# Patient Record
Sex: FEMALE | Race: WHITE | NOT HISPANIC OR LATINO | URBAN - METROPOLITAN AREA
[De-identification: names, ages, dates, MRNs, and addresses within clinical notes are randomized per-mention and may not be internally consistent; named-entity substitution may affect disease eponyms.]

---

## 2024-01-04 ENCOUNTER — LAB OUTSIDE AN ENCOUNTER (OUTPATIENT)
Dept: URBAN - METROPOLITAN AREA CLINIC 118 | Facility: CLINIC | Age: 59
End: 2024-01-04

## 2024-01-04 ENCOUNTER — OFFICE VISIT (OUTPATIENT)
Dept: URBAN - METROPOLITAN AREA CLINIC 118 | Facility: CLINIC | Age: 59
End: 2024-01-04
Payer: COMMERCIAL

## 2024-01-04 VITALS
SYSTOLIC BLOOD PRESSURE: 103 MMHG | TEMPERATURE: 97.3 F | BODY MASS INDEX: 22.5 KG/M2 | WEIGHT: 140 LBS | HEART RATE: 74 BPM | HEIGHT: 66 IN | DIASTOLIC BLOOD PRESSURE: 64 MMHG

## 2024-01-04 DIAGNOSIS — R63.4 UNINTENTIONAL WEIGHT LOSS: ICD-10-CM

## 2024-01-04 DIAGNOSIS — R19.4 CHANGE IN BOWEL HABITS: ICD-10-CM

## 2024-01-04 DIAGNOSIS — R13.19 CERVICAL DYSPHAGIA: ICD-10-CM

## 2024-01-04 PROBLEM — 430331003: Status: ACTIVE | Noted: 2024-01-04

## 2024-01-04 PROBLEM — 448765001: Status: ACTIVE | Noted: 2024-01-04

## 2024-01-04 PROBLEM — 312824007: Status: ACTIVE | Noted: 2024-01-04

## 2024-01-04 PROCEDURE — 99204 OFFICE O/P NEW MOD 45 MIN: CPT | Performed by: INTERNAL MEDICINE

## 2024-01-04 RX ORDER — PANTOPRAZOLE SODIUM 20 MG/1
1 TABLET TABLET, DELAYED RELEASE ORAL ONCE A DAY
Qty: 30 | Refills: 3 | OUTPATIENT
Start: 2024-01-04

## 2024-01-04 RX ORDER — MAGNESIUM GLYCINATE 100 MG
AS DIRECTED TABLET ORAL
Status: ACTIVE | COMMUNITY

## 2024-01-04 RX ORDER — ALENDRONATE SODIUM 10 MG/1
1 TABLET 30 MINUTES BEFORE THE FIRST FOOD, BEVERAGE OR MEDICINE OF THE DAY WITH PLAIN WATER TABLET ORAL ONCE A DAY
Status: DISCONTINUED | COMMUNITY

## 2024-01-04 RX ORDER — VENLAFAXINE HYDROCHLORIDE 37.5 MG/1
1 CAPSULE WITH FOOD CAPSULE, EXTENDED RELEASE ORAL ONCE A DAY
Status: ACTIVE | COMMUNITY

## 2024-01-04 RX ORDER — CHOLECALCIFEROL (VITAMIN D3) 25 MCG
1 CAPSULE CAPSULE ORAL ONCE A DAY
Status: ACTIVE | COMMUNITY

## 2024-01-04 NOTE — PHYSICAL EXAM GASTROINTESTINAL
Abdomen: soft and nondistended, no visible masses, mild epigastric tenderness to palpation, no guarding or rigidity, no rebound tenderness, no palpable masses, normal bowel sounds. Liver and Spleen: no hepatosplenomegaly, liver nontender. Rectal: Deferred

## 2024-01-04 NOTE — HPI-TODAY'S VISIT:
Patient presents today upon referral by PCP Dr. Ethan Luis for evaluation of GERD and dysphagia. States onset of reflux was 2 years ago once started Alendronate for osteoporosis, which she stopped taking for the past month. Notes she was previously on Prolia injections which she would prefer to restart and is planning on making appt with endocrinologist (Dr. Foley) to discuss this. She describes mid-sternal dysphagia and odynophagia to both liquids and solids for the past month, not worsening. Does note some improvement in sxs since taking otc prilosec for 3 weeks. In the past month has lost 4-5 lbs attributed to decreased food intake from early satiety and pain with swallowing. Also c/o GI upset with nausea and one bout of diarrhea every other day since weaning off Effexor. Psych aware she is tapering off. No emesis. No hematochezia or melena. No prior EGD. Last colonoscopy in 2017 normal recommended repeat in 5 years d/t FHX CRC in her sister before 59yo.

## 2024-02-02 ENCOUNTER — COL/EGD (OUTPATIENT)
Dept: URBAN - METROPOLITAN AREA SURGERY CENTER 23 | Facility: SURGERY CENTER | Age: 59
End: 2024-02-02
Payer: COMMERCIAL

## 2024-02-02 DIAGNOSIS — R13.19 CERVICAL DYSPHAGIA: ICD-10-CM

## 2024-02-02 DIAGNOSIS — K22.89 OTHER SPECIFIED DISEASE OF ESOPHAGUS: ICD-10-CM

## 2024-02-02 DIAGNOSIS — Z80.0 BROTHER AT YOUNG AGE FAMILY HISTORY OF COLON CANCER: ICD-10-CM

## 2024-02-02 DIAGNOSIS — Z12.11 COLON CANCER SCREENING: ICD-10-CM

## 2024-02-02 PROCEDURE — G0105 COLORECTAL SCRN; HI RISK IND: HCPCS | Performed by: INTERNAL MEDICINE

## 2024-02-02 PROCEDURE — 43239 EGD BIOPSY SINGLE/MULTIPLE: CPT | Performed by: INTERNAL MEDICINE

## 2024-02-02 PROCEDURE — 43249 ESOPH EGD DILATION <30 MM: CPT | Performed by: INTERNAL MEDICINE

## 2024-02-02 RX ORDER — PANTOPRAZOLE SODIUM 20 MG/1
1 TABLET TABLET, DELAYED RELEASE ORAL ONCE A DAY
Qty: 30 | Refills: 3 | Status: ACTIVE | COMMUNITY
Start: 2024-01-04

## 2024-02-02 RX ORDER — CHOLECALCIFEROL (VITAMIN D3) 25 MCG
1 CAPSULE CAPSULE ORAL ONCE A DAY
Status: ACTIVE | COMMUNITY

## 2024-02-02 RX ORDER — MAGNESIUM GLYCINATE 100 MG
AS DIRECTED TABLET ORAL
Status: ACTIVE | COMMUNITY

## 2024-02-02 RX ORDER — VENLAFAXINE HYDROCHLORIDE 37.5 MG/1
1 CAPSULE WITH FOOD CAPSULE, EXTENDED RELEASE ORAL ONCE A DAY
Status: ACTIVE | COMMUNITY

## 2024-03-05 ENCOUNTER — OV EP (OUTPATIENT)
Dept: URBAN - METROPOLITAN AREA CLINIC 118 | Facility: CLINIC | Age: 59
End: 2024-03-05
Payer: COMMERCIAL

## 2024-03-05 VITALS
SYSTOLIC BLOOD PRESSURE: 110 MMHG | WEIGHT: 139 LBS | BODY MASS INDEX: 22.34 KG/M2 | HEIGHT: 66 IN | HEART RATE: 63 BPM | TEMPERATURE: 98.1 F | DIASTOLIC BLOOD PRESSURE: 75 MMHG

## 2024-03-05 DIAGNOSIS — R63.4 UNINTENTIONAL WEIGHT LOSS: ICD-10-CM

## 2024-03-05 DIAGNOSIS — R19.4 CHANGE IN BOWEL HABITS: ICD-10-CM

## 2024-03-05 DIAGNOSIS — R10.84 GENERALIZED ABDOMINAL PAIN: ICD-10-CM

## 2024-03-05 DIAGNOSIS — R13.10 DYSPHAGIA, UNSPECIFIED TYPE: ICD-10-CM

## 2024-03-05 PROCEDURE — 99214 OFFICE O/P EST MOD 30 MIN: CPT

## 2024-03-05 RX ORDER — PANTOPRAZOLE SODIUM 40 MG/1
1 TABLET TABLET, DELAYED RELEASE ORAL
Qty: 90 | Refills: 3 | OUTPATIENT

## 2024-03-05 RX ORDER — VENLAFAXINE HYDROCHLORIDE 37.5 MG/1
1 CAPSULE WITH FOOD CAPSULE, EXTENDED RELEASE ORAL ONCE A DAY
Status: DISCONTINUED | COMMUNITY

## 2024-03-05 RX ORDER — CHOLECALCIFEROL (VITAMIN D3) 25 MCG
1 CAPSULE CAPSULE ORAL ONCE A DAY
Status: ACTIVE | COMMUNITY

## 2024-03-05 RX ORDER — PANTOPRAZOLE SODIUM 20 MG/1
1 TABLET TABLET, DELAYED RELEASE ORAL ONCE A DAY
Qty: 30 | Refills: 3 | Status: ON HOLD | COMMUNITY
Start: 2024-01-04

## 2024-03-05 RX ORDER — MAGNESIUM GLYCINATE 100 MG
AS DIRECTED TABLET ORAL
Status: ACTIVE | COMMUNITY

## 2024-03-05 RX ORDER — HYOSCYAMINE SULFATE 0.12 MG/1
1 TABLET UNDER THE TONGUE AND ALLOW TO DISSOLVE TABLET SUBLINGUAL
Qty: 90 | Refills: 3 | OUTPATIENT
Start: 2024-03-05 | End: 2024-07-03

## 2024-03-05 NOTE — HPI-TODAY'S VISIT:
3/5/24: Pt presents today for f/u appt. Weight overall stable from last visit (139 lbs previously, 140 lbs today). Since last visit pt had EGD/Colon on 2/2/24. EGD showed circumferential salmon-colored mucosa from 33-35 cm and 5 cm hiatal hernia. EGD bx results not yet resulted/reviewed. Colonoscopy unremarkable except mild sigmoid diverticulosis w/out evidence of bleeding. Of note, has been taking prilosec still w/ breakthrough sxs. Never recieved PPI from pharmacy. Dysphagia improved since being empirically dilated on EGD February 2nd. Sxs of dysphagia only aggravated if doesn't chew food properly. Constant abd discomfort and still with diarrhea, recently fecal incontinence. No fever/chills, N/V, or rectal bleeding. Of note, pt saw ortho and was told to continue bisphosphonate since prolia would not be approved by insurance given her DEXA score, pt would like to get second opinion from Dr. Foley.   ------------------------------------------- 1/4/24 (KELLIE Augustin): Patient presents today upon referral by PCP Dr. Ethan Luis for evaluation of GERD and dysphagia. States onset of reflux was 2 years ago once started Alendronate for osteoporosis, which she stopped taking for the past month. Notes she was previously on Prolia injections which she would prefer to restart and is planning on making appt with endocrinologist (Dr. Foley) to discuss this. She describes mid-sternal dysphagia and odynophagia to both liquids and solids for the past month, not worsening. Does note some improvement in sxs since taking otc prilosec for 3 weeks. In the past month has lost 4-5 lbs attributed to decreased food intake from early satiety and pain with swallowing. Also c/o GI upset with nausea and one bout of diarrhea every other day since weaning off Effexor. Psych aware she is tapering off. No emesis. No hematochezia or melena. No prior EGD. Last colonoscopy in 2017 normal recommended repeat in 5 years d/t FHX CRC in her sister before 61yo.

## 2024-05-30 ENCOUNTER — OFFICE VISIT (OUTPATIENT)
Dept: URBAN - METROPOLITAN AREA CLINIC 118 | Facility: CLINIC | Age: 59
End: 2024-05-30

## 2024-08-16 ENCOUNTER — OFFICE VISIT (OUTPATIENT)
Dept: URBAN - METROPOLITAN AREA CLINIC 92 | Facility: CLINIC | Age: 59
End: 2024-08-16
Payer: COMMERCIAL

## 2024-08-16 ENCOUNTER — ERX REFILL RESPONSE (OUTPATIENT)
Dept: URBAN - METROPOLITAN AREA CLINIC 92 | Facility: CLINIC | Age: 59
End: 2024-08-16

## 2024-08-16 VITALS
TEMPERATURE: 97 F | DIASTOLIC BLOOD PRESSURE: 60 MMHG | HEART RATE: 59 BPM | BODY MASS INDEX: 22.18 KG/M2 | WEIGHT: 138 LBS | HEIGHT: 66 IN | SYSTOLIC BLOOD PRESSURE: 93 MMHG

## 2024-08-16 DIAGNOSIS — R10.13 EPIGASTRIC PAIN: ICD-10-CM

## 2024-08-16 DIAGNOSIS — Z87.19 HISTORY OF GASTROESOPHAGEAL REFLUX (GERD): ICD-10-CM

## 2024-08-16 DIAGNOSIS — R13.19 CERVICAL DYSPHAGIA: ICD-10-CM

## 2024-08-16 DIAGNOSIS — Z80.0 FAMILY HISTORY OF ESOPHAGEAL CANCER: ICD-10-CM

## 2024-08-16 PROCEDURE — 99213 OFFICE O/P EST LOW 20 MIN: CPT

## 2024-08-16 RX ORDER — SUCRALFATE 1 G/10ML
10 ML 1 HOUR BEFORE MEALS AND AT BEDTIME ON AN EMPTY STOMACH SUSPENSION ORAL
Qty: 1200 | OUTPATIENT
Start: 2024-08-16 | End: 2024-09-15

## 2024-08-16 RX ORDER — PANTOPRAZOLE SODIUM 40 MG/1
1 TABLET TABLET, DELAYED RELEASE ORAL
Qty: 90 | Refills: 3 | Status: ACTIVE | COMMUNITY

## 2024-08-16 RX ORDER — PANTOPRAZOLE SODIUM 40 MG/1
1 TABLET TABLET, DELAYED RELEASE ORAL TWICE A DAY
Qty: 180 TABLET | Refills: 3 | OUTPATIENT
Start: 2024-08-16

## 2024-08-16 RX ORDER — CHOLECALCIFEROL (VITAMIN D3) 25 MCG
1 CAPSULE CAPSULE ORAL ONCE A DAY
Status: ACTIVE | COMMUNITY

## 2024-08-16 RX ORDER — SUCRALFATE 1 G/10ML
10 ML 1 HOUR BEFORE MEALS AND AT BEDTIME ON AN EMPTY STOMACH SUSPENSION ORAL
Qty: 1200 | OUTPATIENT

## 2024-08-16 RX ORDER — MAGNESIUM GLYCINATE 100 MG
AS DIRECTED TABLET ORAL
Status: ACTIVE | COMMUNITY

## 2024-08-16 RX ORDER — SUCRALFATE 1 G/1
1 TABLET ON AN EMPTY STOMACH TABLET ORAL TWICE A DAY
Qty: 60 | Refills: 0 | OUTPATIENT

## 2024-08-16 NOTE — HPI-TODAY'S VISIT:
59-year-old female patient presents today for a follow-up visit.  She previously saw KELLIE Keene at [place] in January 2024.  During that time she was having GERD, dysphagia and odynophagia to both solids and liquids.  She was also noting a 4 to 5 pound weight loss due to early satiety.  She had never had a endoscopy before.  Colonoscopy was in 2017 which was normaland she was recommended to repeat in 5 years due to family history of colorectal cancer and a sister before the age of 60.  She had a endoscopy on 2-2-2024 this demonstrated a 5 cm hiatal hernia, salmon-colored mucosa suspicious for Adamson's biopsies showed chronic inflammation no Adamson's she was empirically dilated.  Colonoscopy same day demonstrated diverticula in entire colon otherwise negative.  Patient had a follow-up visit in May and she mentioned her dysphagia had improved but she was still complaining of abdominal discomfort, diarrhea and recently fecal incontinence. She had not received pantoprazole so this was sent to the pharmacy.  She was also given hyoscyamine.  Today she notes that over the past 2 weeks she started to have epigastric abd pain that got worse over time. She went to [facility] this week due to the pain. She had labs which showed Normal lipase, cmp and cbc. SHe had a CT a/p w/ IV that showed no abnormalities. She was told to f/u with GI and no medications were given. She has a long hx of gerd and has been on pantoprazole 40mg QAM. She continues to have mainly epigastric pain that is constant and can be worse with food. She has some nausea but no vomiting. The dysphagia she was having in February is better but not completely resolved. She denies NSAID use. She notes more "oily stool" that floats since onset of sx. musculoskeletal

## 2024-08-16 NOTE — PHYSICAL EXAM GASTROINTESTINAL
Abdomen,  soft, nontender, ttp over epigastric, periumbilical, ruq and rlq no masses palpable, Liver and Spleen,no hepatosplenomegaly

## 2024-08-23 ENCOUNTER — OFFICE VISIT (OUTPATIENT)
Dept: URBAN - METROPOLITAN AREA CLINIC 118 | Facility: CLINIC | Age: 59
End: 2024-08-23
Payer: COMMERCIAL

## 2024-08-23 ENCOUNTER — DASHBOARD ENCOUNTERS (OUTPATIENT)
Age: 59
End: 2024-08-23

## 2024-08-23 VITALS
HEART RATE: 74 BPM | BODY MASS INDEX: 22.28 KG/M2 | WEIGHT: 138.6 LBS | HEIGHT: 66 IN | SYSTOLIC BLOOD PRESSURE: 105 MMHG | DIASTOLIC BLOOD PRESSURE: 67 MMHG | TEMPERATURE: 97.3 F

## 2024-08-23 DIAGNOSIS — Z80.0 FAMILY HISTORY OF COLON CANCER: ICD-10-CM

## 2024-08-23 DIAGNOSIS — R10.13 ABDOMINAL PAIN, EPIGASTRIC: ICD-10-CM

## 2024-08-23 PROBLEM — 79922009: Status: ACTIVE | Noted: 2024-08-23

## 2024-08-23 PROCEDURE — 99214 OFFICE O/P EST MOD 30 MIN: CPT | Performed by: INTERNAL MEDICINE

## 2024-08-23 RX ORDER — PANTOPRAZOLE SODIUM 40 MG/1
1 TABLET TABLET, DELAYED RELEASE ORAL TWICE A DAY
Qty: 180 TABLET | Refills: 3 | Status: ACTIVE | COMMUNITY
Start: 2024-08-16

## 2024-08-23 RX ORDER — CHOLECALCIFEROL (VITAMIN D3) 25 MCG
1 CAPSULE CAPSULE ORAL ONCE A DAY
Status: ACTIVE | COMMUNITY

## 2024-08-23 RX ORDER — PANTOPRAZOLE SODIUM 40 MG/1
1 TABLET TABLET, DELAYED RELEASE ORAL
Qty: 90 | Refills: 3 | Status: ACTIVE | COMMUNITY

## 2024-08-23 RX ORDER — NORTRIPTYLINE HYDROCHLORIDE 25 MG/1
1 CAPSULE CAPSULE ORAL ONCE A DAY
Qty: 30 | Refills: 5 | OUTPATIENT
Start: 2024-08-23

## 2024-08-23 RX ORDER — SUCRALFATE 1 G/1
1 TABLET ON AN EMPTY STOMACH TABLET ORAL TWICE A DAY
Qty: 60 | Refills: 0 | Status: ACTIVE | COMMUNITY

## 2024-08-23 RX ORDER — MAGNESIUM GLYCINATE 100 MG
AS DIRECTED TABLET ORAL
Status: ACTIVE | COMMUNITY

## 2024-08-23 NOTE — HPI-TODAY'S VISIT:
8/23/24 - Pt here today to review symptoms and findings and clear up confusion.  Pt complains of 4 wk hx of RUQ crawling sensation, constant, but able to sleep.  Hurts to bend over.  Sharp stabbing pain like sensation of something kicking at times.  No prior similar symptoms.  No change with po intake.  No weight loss.  Pt also complains of constant bloating.  No exacerbants.  Sleeps well.   BMs typically variable c/w known IBS.  No constipation.  No F/C/NS. No dysphagia.  No GERD, on daily PPI.  Of note, pt had been on Effexor for over 15 years, and quit last year due to side effects. Denies stress.  Worried about pancreatic cancer. - - - - - - - - - 8/16/24 - 59-year-old female patient presents today for a follow-up visit.  She previously saw KELLIE Keene at [place] in January 2024.  During that time she was having GERD, dysphagia and odynophagia to both solids and liquids.  She was also noting a 4 to 5 pound weight loss due to early satiety.  She had never had a endoscopy before.  Colonoscopy was in 2017 which was normaland she was recommended to repeat in 5 years due to family history of colorectal cancer and a sister before the age of 60.  She had a endoscopy on 2-2-2024 this demonstrated a 5 cm hiatal hernia, salmon-colored mucosa suspicious for Adamson's biopsies showed chronic inflammation no Adamson's she was empirically dilated.  Colonoscopy same day demonstrated diverticula in entire colon otherwise negative.  Patient had a follow-up visit in May and she mentioned her dysphagia had improved but she was still complaining of abdominal discomfort, diarrhea and recently fecal incontinence. She had not received pantoprazole so this was sent to the pharmacy.  She was also given hyoscyamine.  Today she notes that over the past 2 weeks she started to have epigastric abd pain that got worse over time. She went to [facility] this week due to the pain. She had labs which showed Normal lipase, cmp and cbc. SHe had a CT a/p w/ IV that showed no abnormalities. She was told to f/u with GI and no medications were given. She has a long hx of gerd and has been on pantoprazole 40mg QAM. She continues to have mainly epigastric pain that is constant and can be worse with food. She has some nausea but no vomiting. The dysphagia she was having in February is better but not completely resolved. She denies NSAID use. She notes more "oily stool" that floats since onset of sx.

## 2024-08-28 ENCOUNTER — OFFICE VISIT (OUTPATIENT)
Dept: URBAN - METROPOLITAN AREA CLINIC 118 | Facility: CLINIC | Age: 59
End: 2024-08-28

## 2024-09-16 ENCOUNTER — OFFICE VISIT (OUTPATIENT)
Dept: URBAN - METROPOLITAN AREA CLINIC 118 | Facility: CLINIC | Age: 59
End: 2024-09-16